# Patient Record
Sex: MALE | Race: BLACK OR AFRICAN AMERICAN | NOT HISPANIC OR LATINO | URBAN - METROPOLITAN AREA
[De-identification: names, ages, dates, MRNs, and addresses within clinical notes are randomized per-mention and may not be internally consistent; named-entity substitution may affect disease eponyms.]

---

## 2018-05-21 ENCOUNTER — EMERGENCY (EMERGENCY)
Facility: HOSPITAL | Age: 38
LOS: 1 days | Discharge: HOME | End: 2018-05-21
Admitting: EMERGENCY MEDICINE
Payer: COMMERCIAL

## 2018-05-21 VITALS
HEART RATE: 67 BPM | DIASTOLIC BLOOD PRESSURE: 76 MMHG | TEMPERATURE: 98 F | SYSTOLIC BLOOD PRESSURE: 138 MMHG | OXYGEN SATURATION: 99 % | RESPIRATION RATE: 18 BRPM

## 2018-05-21 DIAGNOSIS — R53.83 OTHER FATIGUE: ICD-10-CM

## 2018-05-21 DIAGNOSIS — R41.82 ALTERED MENTAL STATUS, UNSPECIFIED: ICD-10-CM

## 2018-05-21 DIAGNOSIS — F10.120 ALCOHOL ABUSE WITH INTOXICATION, UNCOMPLICATED: ICD-10-CM
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
## 2018-05-21 PROCEDURE — 99285 EMERGENCY DEPT VISIT HI MDM: CPT | Mod: 25

## 2018-05-21 NOTE — ED PROVIDER NOTE - OBJECTIVE STATEMENT
38 year old male brought in by EMS for alcohol intoxication and marijuana use. admits to both. patient with leaves in hair. no laceration on head.   Placed in stretcher and quickly falls asleep.  Unable to cooperate with remainder of history.

## 2018-05-21 NOTE — ED PROVIDER NOTE - MEDICAL DECISION MAKING DETAILS
Alcohol intoxication, leaves in hair will do head CT, not hypoglycemic, neuro exam non-focal, will observe and reassess frequently.

## 2018-05-22 VITALS
SYSTOLIC BLOOD PRESSURE: 131 MMHG | RESPIRATION RATE: 18 BRPM | TEMPERATURE: 98 F | HEART RATE: 73 BPM | DIASTOLIC BLOOD PRESSURE: 75 MMHG | OXYGEN SATURATION: 99 %

## 2018-05-22 LAB — ETHANOL SERPL-MCNC: 216 MG/DL — HIGH

## 2018-05-22 PROCEDURE — 70450 CT HEAD/BRAIN W/O DYE: CPT | Mod: 26

## 2018-05-22 RX ORDER — MIDAZOLAM HYDROCHLORIDE 1 MG/ML
5 INJECTION, SOLUTION INTRAMUSCULAR; INTRAVENOUS ONCE
Qty: 0 | Refills: 0 | Status: DISCONTINUED | OUTPATIENT
Start: 2018-05-22 | End: 2018-05-22

## 2018-05-22 RX ADMIN — MIDAZOLAM HYDROCHLORIDE 5 MILLIGRAM(S): 1 INJECTION, SOLUTION INTRAMUSCULAR; INTRAVENOUS at 00:26

## 2023-02-13 NOTE — ED PROVIDER NOTE - PROGRESS NOTE DETAILS
Call return for problems  Continue Ocean Spray as needed  Continue Bactroban as prescribed for 2 weeks  Resume intranasal steroid spray and intranasal antihistamine spray  Follow-up in 3 to 4 weeks I advised the patient of the risks in continuing to use tobacco and recommended complete cessation, The inherent risks including the risk of disability, development of a malignancy and/or death was discussed.  The patient indicated understanding.    The patient is now awake and alert, clinically sober.  Able to walk a straight line.  Repeat exam and neuro/cranial nerve exams normal.  No evidence of head/neck trauma.  Patient denies any pain or other complaints.  Denies cp/sob/ha/abd pain.  Abd soft, lungs clear, heart exam normal.  Chay po challenge.  Patient says only used alcohol no other substances.  Denies any assault.  Feels much better and pt feels safe for discharge.  No evidence of intoxication at this time or alcohol withdrawal.  No other complaints on discharge.

## 2023-06-12 ENCOUNTER — INPATIENT (INPATIENT)
Facility: HOSPITAL | Age: 43
LOS: 0 days | Discharge: HOME | DRG: 897 | End: 2023-06-13
Attending: INTERNAL MEDICINE | Admitting: INTERNAL MEDICINE
Payer: COMMERCIAL

## 2023-06-12 VITALS
RESPIRATION RATE: 15 BRPM | HEART RATE: 116 BPM | WEIGHT: 141.1 LBS | SYSTOLIC BLOOD PRESSURE: 88 MMHG | HEIGHT: 70 IN | TEMPERATURE: 97 F | OXYGEN SATURATION: 98 % | DIASTOLIC BLOOD PRESSURE: 60 MMHG

## 2023-06-12 LAB
ALBUMIN SERPL ELPH-MCNC: 3.3 G/DL — LOW (ref 3.4–5)
ALP SERPL-CCNC: 170 U/L — HIGH (ref 40–120)
ALT FLD-CCNC: 42 U/L — SIGNIFICANT CHANGE UP (ref 12–42)
ANION GAP SERPL CALC-SCNC: 14 MMOL/L — SIGNIFICANT CHANGE UP (ref 9–16)
APPEARANCE UR: CLEAR — SIGNIFICANT CHANGE UP
APTT BLD: 31.3 SEC — SIGNIFICANT CHANGE UP (ref 27.5–35.5)
AST SERPL-CCNC: 47 U/L — HIGH (ref 15–37)
BASOPHILS # BLD AUTO: 0.03 K/UL — SIGNIFICANT CHANGE UP (ref 0–0.2)
BASOPHILS NFR BLD AUTO: 0.4 % — SIGNIFICANT CHANGE UP (ref 0–2)
BILIRUB SERPL-MCNC: 1.2 MG/DL — SIGNIFICANT CHANGE UP (ref 0.2–1.2)
BILIRUB UR-MCNC: NEGATIVE — SIGNIFICANT CHANGE UP
BUN SERPL-MCNC: 22 MG/DL — SIGNIFICANT CHANGE UP (ref 7–23)
CALCIUM SERPL-MCNC: 9.5 MG/DL — SIGNIFICANT CHANGE UP (ref 8.5–10.5)
CHLORIDE SERPL-SCNC: 89 MMOL/L — LOW (ref 96–108)
CO2 SERPL-SCNC: 29 MMOL/L — SIGNIFICANT CHANGE UP (ref 22–31)
COLOR SPEC: YELLOW — SIGNIFICANT CHANGE UP
CREAT SERPL-MCNC: 2.32 MG/DL — HIGH (ref 0.5–1.3)
DIFF PNL FLD: ABNORMAL
EGFR: 34 ML/MIN/1.73M2 — LOW
EOSINOPHIL # BLD AUTO: 0.1 K/UL — SIGNIFICANT CHANGE UP (ref 0–0.5)
EOSINOPHIL NFR BLD AUTO: 1.2 % — SIGNIFICANT CHANGE UP (ref 0–6)
ETHANOL SERPL-MCNC: <3 MG/DL — SIGNIFICANT CHANGE UP
GLUCOSE SERPL-MCNC: 113 MG/DL — HIGH (ref 70–99)
GLUCOSE UR QL: NEGATIVE MG/DL — SIGNIFICANT CHANGE UP
HCT VFR BLD CALC: 37.9 % — LOW (ref 39–50)
HGB BLD-MCNC: 13 G/DL — SIGNIFICANT CHANGE UP (ref 13–17)
IMM GRANULOCYTES NFR BLD AUTO: 0.4 % — SIGNIFICANT CHANGE UP (ref 0–0.9)
INR BLD: 1.04 — SIGNIFICANT CHANGE UP (ref 0.88–1.16)
KETONES UR-MCNC: 15 MG/DL
LACTATE SERPL-SCNC: 0.8 MMOL/L — SIGNIFICANT CHANGE UP (ref 0.4–2)
LACTATE SERPL-SCNC: 2.8 MMOL/L — HIGH (ref 0.4–2)
LACTATE SERPL-SCNC: 3 MMOL/L — HIGH (ref 0.4–2)
LEUKOCYTE ESTERASE UR-ACNC: NEGATIVE — SIGNIFICANT CHANGE UP
LYMPHOCYTES # BLD AUTO: 1.8 K/UL — SIGNIFICANT CHANGE UP (ref 1–3.3)
LYMPHOCYTES # BLD AUTO: 21.4 % — SIGNIFICANT CHANGE UP (ref 13–44)
MCHC RBC-ENTMCNC: 34.1 PG — HIGH (ref 27–34)
MCHC RBC-ENTMCNC: 34.3 GM/DL — SIGNIFICANT CHANGE UP (ref 32–36)
MCV RBC AUTO: 99.5 FL — SIGNIFICANT CHANGE UP (ref 80–100)
MONOCYTES # BLD AUTO: 0.77 K/UL — SIGNIFICANT CHANGE UP (ref 0–0.9)
MONOCYTES NFR BLD AUTO: 9.1 % — SIGNIFICANT CHANGE UP (ref 2–14)
NEUTROPHILS # BLD AUTO: 5.7 K/UL — SIGNIFICANT CHANGE UP (ref 1.8–7.4)
NEUTROPHILS NFR BLD AUTO: 67.5 % — SIGNIFICANT CHANGE UP (ref 43–77)
NITRITE UR-MCNC: NEGATIVE — SIGNIFICANT CHANGE UP
NRBC # BLD: 0 /100 WBCS — SIGNIFICANT CHANGE UP (ref 0–0)
PCO2 BLDV: 50 MMHG — SIGNIFICANT CHANGE UP (ref 42–55)
PCP SPEC-MCNC: SIGNIFICANT CHANGE UP
PH BLDV: 7.37 — SIGNIFICANT CHANGE UP (ref 7.32–7.43)
PH UR: 5.5 — SIGNIFICANT CHANGE UP (ref 5–8)
PLATELET # BLD AUTO: 241 K/UL — SIGNIFICANT CHANGE UP (ref 150–400)
PO2 BLDV: <35 MMHG — SIGNIFICANT CHANGE UP (ref 25–45)
POTASSIUM SERPL-MCNC: 3.4 MMOL/L — LOW (ref 3.5–5.3)
POTASSIUM SERPL-SCNC: 3.4 MMOL/L — LOW (ref 3.5–5.3)
PROT SERPL-MCNC: 8.5 G/DL — HIGH (ref 6.4–8.2)
PROT UR-MCNC: SIGNIFICANT CHANGE UP MG/DL
PROTHROM AB SERPL-ACNC: 12.2 SEC — SIGNIFICANT CHANGE UP (ref 10.5–13.4)
RBC # BLD: 3.81 M/UL — LOW (ref 4.2–5.8)
RBC # FLD: 13.1 % — SIGNIFICANT CHANGE UP (ref 10.3–14.5)
SAO2 % BLDV: 29 % — LOW (ref 67–88)
SARS-COV-2 RNA SPEC QL NAA+PROBE: SIGNIFICANT CHANGE UP
SODIUM SERPL-SCNC: 132 MMOL/L — SIGNIFICANT CHANGE UP (ref 132–145)
SP GR SPEC: 1.04 — HIGH (ref 1–1.03)
TROPONIN I, HIGH SENSITIVITY RESULT: 21.8 NG/L — SIGNIFICANT CHANGE UP
UROBILINOGEN FLD QL: 1 MG/DL — SIGNIFICANT CHANGE UP (ref 0.2–1)
WBC # BLD: 8.43 K/UL — SIGNIFICANT CHANGE UP (ref 3.8–10.5)
WBC # FLD AUTO: 8.43 K/UL — SIGNIFICANT CHANGE UP (ref 3.8–10.5)

## 2023-06-12 PROCEDURE — 0042T: CPT

## 2023-06-12 PROCEDURE — 70496 CT ANGIOGRAPHY HEAD: CPT | Mod: 26

## 2023-06-12 PROCEDURE — 99222 1ST HOSP IP/OBS MODERATE 55: CPT | Mod: GC

## 2023-06-12 PROCEDURE — 99285 EMERGENCY DEPT VISIT HI MDM: CPT

## 2023-06-12 PROCEDURE — 70498 CT ANGIOGRAPHY NECK: CPT | Mod: 26

## 2023-06-12 RX ORDER — THIAMINE MONONITRATE (VIT B1) 100 MG
100 TABLET ORAL ONCE
Refills: 0 | Status: COMPLETED | OUTPATIENT
Start: 2023-06-12 | End: 2023-06-12

## 2023-06-12 RX ORDER — CLOPIDOGREL BISULFATE 75 MG/1
75 TABLET, FILM COATED ORAL ONCE
Refills: 0 | Status: COMPLETED | OUTPATIENT
Start: 2023-06-12 | End: 2023-06-12

## 2023-06-12 RX ORDER — SODIUM CHLORIDE 9 MG/ML
1000 INJECTION INTRAMUSCULAR; INTRAVENOUS; SUBCUTANEOUS ONCE
Refills: 0 | Status: COMPLETED | OUTPATIENT
Start: 2023-06-12 | End: 2023-06-12

## 2023-06-12 RX ORDER — CEFTRIAXONE 500 MG/1
1000 INJECTION, POWDER, FOR SOLUTION INTRAMUSCULAR; INTRAVENOUS ONCE
Refills: 0 | Status: COMPLETED | OUTPATIENT
Start: 2023-06-12 | End: 2023-06-12

## 2023-06-12 RX ORDER — ACETAMINOPHEN 500 MG
975 TABLET ORAL ONCE
Refills: 0 | Status: COMPLETED | OUTPATIENT
Start: 2023-06-12 | End: 2023-06-12

## 2023-06-12 RX ORDER — VALACYCLOVIR 500 MG/1
1000 TABLET, FILM COATED ORAL ONCE
Refills: 0 | Status: COMPLETED | OUTPATIENT
Start: 2023-06-12 | End: 2023-06-12

## 2023-06-12 RX ORDER — ASPIRIN/CALCIUM CARB/MAGNESIUM 324 MG
81 TABLET ORAL DAILY
Refills: 0 | Status: DISCONTINUED | OUTPATIENT
Start: 2023-06-12 | End: 2023-06-13

## 2023-06-12 RX ORDER — DIAZEPAM 5 MG
10 TABLET ORAL ONCE
Refills: 0 | Status: DISCONTINUED | OUTPATIENT
Start: 2023-06-12 | End: 2023-06-12

## 2023-06-12 RX ADMIN — Medication 100 MILLIGRAM(S): at 19:11

## 2023-06-12 RX ADMIN — SODIUM CHLORIDE 1000 MILLILITER(S): 9 INJECTION INTRAMUSCULAR; INTRAVENOUS; SUBCUTANEOUS at 15:45

## 2023-06-12 RX ADMIN — VALACYCLOVIR 1000 MILLIGRAM(S): 500 TABLET, FILM COATED ORAL at 15:45

## 2023-06-12 RX ADMIN — CEFTRIAXONE 100 MILLIGRAM(S): 500 INJECTION, POWDER, FOR SOLUTION INTRAMUSCULAR; INTRAVENOUS at 15:44

## 2023-06-12 RX ADMIN — Medication 975 MILLIGRAM(S): at 19:10

## 2023-06-12 RX ADMIN — SODIUM CHLORIDE 1000 MILLILITER(S): 9 INJECTION INTRAMUSCULAR; INTRAVENOUS; SUBCUTANEOUS at 13:56

## 2023-06-12 RX ADMIN — Medication 10 MILLIGRAM(S): at 17:17

## 2023-06-12 RX ADMIN — CLOPIDOGREL BISULFATE 75 MILLIGRAM(S): 75 TABLET, FILM COATED ORAL at 21:19

## 2023-06-12 RX ADMIN — Medication 81 MILLIGRAM(S): at 21:19

## 2023-06-12 RX ADMIN — SODIUM CHLORIDE 1000 MILLILITER(S): 9 INJECTION INTRAMUSCULAR; INTRAVENOUS; SUBCUTANEOUS at 13:08

## 2023-06-12 NOTE — H&P ADULT - PROBLEM SELECTOR PLAN 2
Patient reports drinking up to 1 pint daily for over 20 years. Last drink 6/11 PM (approx 24 hours prior to admission). No history of alcohol withdrawal, no hx of seizures/intubations. Denies hx of cirrhosis. CIWA 2 for mild anxiety on exam.   - low risk CIWA protocol  - ativan prn for CIWA > 8  - thiamine, MV, folate  - monitor LFTs

## 2023-06-12 NOTE — H&P ADULT - SOCIAL HISTORY: ALCOHOL USE
drinks up to 1 pint a day, more on the weekends  no history of withdrawal or seizures/intubations in the past  previously in rehab on naltrexone  last drink 6/11 PM

## 2023-06-12 NOTE — H&P ADULT - PROBLEM SELECTOR PLAN 7
F: none  E: replete as needed  N: DASH diet  DVT ppx: heparin subq  GI ppx: not indicated  Dispo: tele

## 2023-06-12 NOTE — ED ADULT NURSE NOTE - CHIEF COMPLAINT QUOTE
BIBA from [facility] for sudden weakness since 845am; felt weak whiel in an elevator and also c/o left ear ringing and muffling; at [facility], EMS said "they couldn't get a BP on him and we had to do a manual BP; his blood pressure was low there."; +slurred speech and right side facial droop noted in triage; called code stroke at 2782

## 2023-06-12 NOTE — ED ADULT NURSE REASSESSMENT NOTE - NS ED NURSE REASSESS COMMENT FT1
Pt reports no dysarthria at this time, speaking in complete sentences with Ease. L sided mouth droop still noted. Pt otherwise denies neuro symptoms, and none noted on assessment.

## 2023-06-12 NOTE — H&P ADULT - NSHPPHYSICALEXAM_GEN_ALL_CORE
.  VITAL SIGNS:  T(C): 37.4 (06-12-23 @ 23:17), Max: 37.6 (06-12-23 @ 17:02)  T(F): 99.3 (06-12-23 @ 23:17), Max: 99.6 (06-12-23 @ 17:02)  HR: 103 (06-13-23 @ 00:51) (97 - 116)  BP: 134/96 (06-13-23 @ 00:51) (88/60 - 134/96)  BP(mean): 111 (06-13-23 @ 00:51) (111 - 111)  RR: 18 (06-13-23 @ 00:51) (15 - 18)  SpO2: 97% (06-13-23 @ 00:51) (97% - 98%)  Wt(kg): --    PHYSICAL EXAM:    Constitutional: resting comfortably in bed; NAD  Head: NC/AT, lipoma on forehead  Eyes: PERRL, EOMI, anicteric sclera  ENT: no nasal discharge; uvula midline, no oropharyngeal erythema or exudates; MMM  Neck: supple; no JVD or thyromegaly  Respiratory: CTA B/L; no W/R/R, no retractions  Cardiac: +S1/S2; RRR; no M/R/G  Gastrointestinal: abdomen soft, NT/ND; no rebound or guarding; +BSx4  Back: spine midline, no bony tenderness or step-offs; no CVAT B/L  Extremities: WWP, no clubbing or cyanosis; no peripheral edema  Musculoskeletal: NROM x4; no joint swelling, tenderness or erythema  Vascular: 2+ radial, DP/PT pulses B/L  Dermatologic: skin warm, dry and intact; no rashes, wounds, or scars; +mole on back (chronic)  Neurologic: AAOx3; CNII-XII grossly intact; no focal deficits; strength and sensation intact  Psychiatric: appears mildly anxious .  VITAL SIGNS:  T(C): 37.4 (06-12-23 @ 23:17), Max: 37.6 (06-12-23 @ 17:02)  T(F): 99.3 (06-12-23 @ 23:17), Max: 99.6 (06-12-23 @ 17:02)  HR: 103 (06-13-23 @ 00:51) (97 - 116)  BP: 134/96 (06-13-23 @ 00:51) (88/60 - 134/96)  BP(mean): 111 (06-13-23 @ 00:51) (111 - 111)  RR: 18 (06-13-23 @ 00:51) (15 - 18)  SpO2: 97% (06-13-23 @ 00:51) (97% - 98%)  Wt(kg): --    PHYSICAL EXAM:    Constitutional: resting comfortably in bed; NAD  Head: NC/AT, lipoma on forehead  Eyes: PERRL, EOMI, anicteric sclera  ENT: no nasal discharge; uvula midline, no oropharyngeal erythema or exudates; MMM  Neck: supple; no JVD or thyromegaly  Respiratory: CTA B/L; no W/R/R, no retractions  Cardiac: +S1/S2; RRR; no M/R/G  Gastrointestinal: abdomen soft, NT/ND; no rebound or guarding; +BSx4  Back: spine midline, no bony tenderness or step-offs; no CVAT B/L  Extremities: WWP, no clubbing or cyanosis; no peripheral edema  Musculoskeletal: NROM x4; no joint swelling, tenderness or erythema  Vascular: 2+ radial, DP/PT pulses B/L  Dermatologic: skin warm, dry and intact; no rashes, wounds, or scars; +mole on back (chronic)  Neurologic: AAOx3; CNII-XII grossly intact; ?mild facial asymmetry, no focal deficits; strength and sensation intact  Psychiatric: appears mildly anxious

## 2023-06-12 NOTE — ED ADULT NURSE REASSESSMENT NOTE - NS ED NURSE REASSESS COMMENT FT1
Report received from PARVIN Morataya. Pt resting in stretcher comfortably. Denies any medical complaints at this time, only c/o some anxiety regarding admission. Vs done. Ambulate with steady gait to bathroom. denies weakness.

## 2023-06-12 NOTE — H&P ADULT - PROBLEM SELECTOR PLAN 1
Patient with TIA v. CVA. Reports right ear fullness and blurry vision that started the morning of admission, then resolved spontaneously. Hx of stroke in 2018 with L facial droop but denies any weakness or residual symptoms. Takes Lipitor 80 daily, but does not take aspirin. Stroke code called, negative CT, CTAs and perfusion studies. On exam, patient with grossly intact neuro exam.   - start ASA 81 and Plavix 75 qd  - c/w lipitor 80 qhs  - neuro checks q4h  - permissive HTN iso possible stroke, will hold home BP meds  - ordered MRI brain Patient with TIA v. CVA. Reports right ear fullness and blurry vision that started the morning of admission, then resolved spontaneously. Hx of stroke in 2018 with L facial droop but denies any weakness or residual symptoms. Takes Lipitor 80 daily, but does not take aspirin. Stroke code called, negative CT, CTAs and perfusion studies. On exam, patient with grossly intact neuro exam.  - start ASA 81 and Plavix 75 qd  - c/w lipitor 80 qhs  - neuro checks q4h  - permissive HTN iso possible stroke, will hold home BP meds  - ordered MRI brain  - A1c, lipid panel Patient with TIA v. CVA. Reports right ear fullness and blurry vision that started the morning of admission, then resolved spontaneously. Hx of stroke in 2018 with L facial droop but denies any weakness or residual symptoms. Takes Lipitor 80 daily, but does not take aspirin. Stroke code called, negative CT, CTAs and perfusion studies. On exam, patient with grossly intact neuro exam.  - start ASA 81 and Plavix 75 qd  - c/w lipitor 80 qhs  - neuro checks q4h  - permissive HTN iso possible stroke, will hold home BP meds  - ordered MRI brain  - A1c, lipid panel  - neurology following, appreciate recs

## 2023-06-12 NOTE — H&P ADULT - HISTORY OF PRESENT ILLNESS
HPI:    In the ED:  Initial vital signs: T: XX F, HR: XX, BP: XX, R: XX, SpO2: XX% on RA  Labs: significant for  CXR:   EKG:   Medications:   Consults: none  Patient is a 42yo M with PMHx of CVA in 2018 (L sided facial droop at the time, no current deficits), chronic pancreatitis, alcohol use disorder, HTN, gout who presents from [facility] for weakness, right ear "popping sensation", and blurred vision that started that morning and resolved prior to presentation to ED. However, on arrival noted to have slurred speech and right side facial droop, stroke code called. Studies largely negative.     In the ED:  Initial vitals: Temp 97.4 F, , BP 88/60, RR 15, SpO2 98% RA  Labs significant for: lactate 2.8, K 3.4, Cl 89, Cr 2.32, alk phos 170, AST 47, UA: spec grv 1.040, mod blood, granular casts present, ketones 15; UTox positive for THC, BAL <3  CTA H&N: No large vessel occlusion or high-grade stenosis. No significant steno-occlusive disease  CTH: No acute intracranial hemorrhage, mass effect or demarcated territorial   infarction.  Interventions: 1L NS x 3, CTX 1g x 1, ASA 81mg x 1, plavix 75mg x 1, valtrex 1g PO x 1, thiamine 100mg IV x 1, valium 1mg IV x 1, acetaminophen 975mg PO x 1    Admitted to telemetry iso possible CVA v. TIA. Patient seen and examined at beside. Reports no symptoms at this time aside from some anxiety about being in the hospital. Reports ringing/fullness of R ear improved after removal of ear wax at [facility], and blurred vision had largely resolved upon presentation to ED. Reports hx of stroke in 2018 with no current deficits. Denies headache, blurred vision, ear fullness, facial weakness, chest pain, SOB, abdominal pain, urinary changes, fevers/chills, N/V, diarrhea/constipation, LE edema at this time.  Patient is a 44yo M with PMHx of CVA in 2018 (L sided facial droop at the time, no current deficits), chronic pancreatitis, alcohol use disorder, HTN, gout who presents from [facility] for weakness, right ear "popping sensation", and blurred vision that started that morning and resolved prior to presentation to ED. However, on arrival noted to have slurred speech and right side facial droop, stroke code called. Studies largely negative.     In the ED:  Initial vitals: Temp 97.4 F, , BP 88/60, RR 15, SpO2 98% RA  Labs significant for: lactate 2.8, K 3.4, Cl 89, Cr 2.32, alk phos 170, AST 47, UA: spec grAv 1.040, mod blood, granular casts present, ketones 15; UTox positive for THC, BAL <3  CTA H&N: No large vessel occlusion or high-grade stenosis. No significant steno-occlusive disease  CTH: No acute intracranial hemorrhage, mass effect or demarcated territorial   infarction.  Interventions: 1L NS x 3, CTX 1g x 1, ASA 81mg x 1, plavix 75mg x 1, valtrex 1g PO x 1, thiamine 100mg IV x 1, valium 1mg IV x 1, acetaminophen 975mg PO x 1    Admitted to telemetry iso possible CVA v. TIA. Patient seen and examined at beside. Reports no symptoms at this time aside from some anxiety about being in the hospital. Reports ringing/fullness of R ear improved after removal of ear wax at [facility], and blurred vision had largely resolved upon presentation to ED. Reports hx of stroke in 2018 with no current deficits. Denies headache, blurred vision, ear fullness, facial weakness, chest pain, SOB, abdominal pain, urinary changes, fevers/chills, N/V, diarrhea/constipation, LE edema at this time.

## 2023-06-12 NOTE — ED PROVIDER NOTE - PROGRESS NOTE DETAILS
Patient admits to more than 1 pint a day of alcohol for years.  Denies history of withdrawal syndrome.  Currently denies anxiety, feeling sweaty, palpitations, visual or auditory hallucinations.

## 2023-06-12 NOTE — H&P ADULT - PROBLEM SELECTOR PLAN 5
99.4 Patient with hx of HTN on home amlodipine 5mg qd and olmesartan 40mg qd.   - hold home meds now for permissive HTN iso stroke

## 2023-06-12 NOTE — ED ADULT TRIAGE NOTE - CHIEF COMPLAINT QUOTE
BIBA from [facility] for sudden weakness since 845am; felt weak whiel in an elevator and also c/o left ear ringing and muffling; at [facility], EMS said "they couldn't get a BP on him and we had to do a manual BP; his blood pressure was low there."; +slurred speech and right side facial droop noted in triage; called code stroke at 5342

## 2023-06-12 NOTE — ED PROVIDER NOTE - OBJECTIVE STATEMENT
44 y/o M w/hx chronic pancreatitis from alcoholism, in recovery w/planned pancreatic stenting to take place in the next few weeks, HTN on ARB & amlodipine 5mg, depression, BIBEMS from [facility] c/o acute onset R ear fullness/decreased hearing, blurry vision waxing/waning, lightheadedness, global fatigue with predominantly RUE weakness all starting at 8:45AM. States he woke up at 7am feeling in his normal state of health. Of note, was hypotensive in [facility] and in route to hospital with BPs 70s/40s. No IVF given PTA. Denies fever/chills/recent illness. No HA, neck pain, CP/cough/SOB, abd pain, n/v, changes in stools, or uinary sx. Notes no changes to meds.

## 2023-06-12 NOTE — H&P ADULT - PROBLEM SELECTOR PLAN 3
Chronic, likely 2/2 heavy alcohol use. Patient currently being evaluated by outside physician for pancreatitis. Denies abdominal pain or discomfort at this time.  - monitor symptoms for now  - f/u lipase

## 2023-06-12 NOTE — H&P ADULT - ASSESSMENT
Patient is a 44yo M with PMHx of CVA in 2018 (L sided facial droop at the time, no current deficits), chronic pancreatitis, alcohol use disorder, HTN, gout who presents from [facility] for weakness, right ear "popping sensation", and blurred vision concerning for CVA v. TIA, admitted to telemetry for monitoring.

## 2023-06-12 NOTE — ED ADULT NURSE REASSESSMENT NOTE - NS ED NURSE REASSESS COMMENT FT1
As per MD Josue, q1h Neuro and [facility] no longer indicated. Orders discontinued as per MD Josue order.

## 2023-06-12 NOTE — ED PROVIDER NOTE - PHYSICAL EXAMINATION
VITAL SIGNS: I have reviewed nursing notes and confirm.  CONSTITUTIONAL: Well-developed; well-nourished; in no acute distress.  SKIN: Skin exam is warm and dry, no acute rash.  HEAD: Normocephalic; atraumatic.  EYES: PERRL, EOM intact; conjunctiva and sclera clear.  ENT: No nasal discharge; airway clear.  NECK: Supple; non tender.  CARD: + tachycardic regular rhythm  RESP: Unlabored. No wheezes, rales or rhonchi.  ABD: soft; non-distended; non-tender  EXT: Normal ROM. No cyanosis or edema. Non-ttp all ext, distal pulses intact  NEURO: Alert, oriented x3, + L sided facial droop with forehead sparing, + mild dysarthria, 5/5 strength all ext, no sensory deficits, no dysmetria finger to nose, no tremor or fasciculations, gait deferred  PSYCH: Cooperative, appropriate.

## 2023-06-12 NOTE — H&P ADULT - PROBLEM SELECTOR PLAN 4
Patient with Cr elevated to 2.32 on arrival, likely prerenal iso poor PO intake recently v. CKD. S/p 3L NS in the ED.  - obtain collateral about baseline sCr  - urine lytes  - bladder scan to r/o obstruction  - consider renal sono  - monitor I/Os  - avoid nephrotoxic medications  - monitor BMP

## 2023-06-12 NOTE — ED PROVIDER NOTE - NS SC NIH SEDATED_GEN_A_CORE
No
Non-slip footwear when patient is off stretcher/Physically safe environment: no spills, clutter or unnecessary equipment/[place] to call system/Instruct patient to call for assistance/Reinforce activity limits and safety measures with patient and family/Monitor gait and stability/Monitor for mental status changes and reorient to person, place, and time

## 2023-06-12 NOTE — H&P ADULT - SOCIAL HISTORY: SUBSTANCE USE
occasional marijuana use  has used cocaine and asmara dust in the past, last used 2019  denies ever using IV drugs

## 2023-06-12 NOTE — H&P ADULT - ATTENDING COMMENTS
Asad Navarrete [id number] [place]  This is a 42 y/o male with h/o alcohol use disorder, chronic pancreatitis  and HTN who had acute onset of R facial droop and slurred speech.  CT and CTA showed no acute changes, neuro was contacted and he was not a candidate for tPA.  He was hypotensive and was given IVF, cre 2.32,   A/P:  -TIA vs CVA with facial droop and slurred speech  -alcohol use disorder  -HTN  -acute renal injury; pre renal  >Asa. Plavix, statins  >permissive HTN, no pressors  >MRI brain  >thiamine, folate  >f/u with neurology  See the resident's note for the rest of  the details as discussed.  D/W neurologist and ED physician at [facility]

## 2023-06-12 NOTE — ED PROVIDER NOTE - CLINICAL SUMMARY MEDICAL DECISION MAKING FREE TEXT BOX
+ facial droop and dysarthria of acute onset in setting of hypotension/tachycardia/elevated lactate and low grade temp of 99.6 orally. Stroke code called and shared decision made to not pursue TNK, negative CT, CTAs and perfusion studies. Treated for possible sepsis despite no leukocytosis with ceftriaxone, cultures sent. Given thiamine and valium to cover for possible early etoh withdrawal syndrome. Given valtrex for possible bell's palsy (though less likely given that facial droop does not include the forehead). d/w both stroke and MICU, agree to admit pt to medicine stepdown unit.

## 2023-06-12 NOTE — H&P ADULT - NSICDXPASTMEDICALHX_GEN_ALL_CORE_FT
PAST MEDICAL HISTORY:  Gout     History of alcohol use disorder     History of stroke     HTN (hypertension)

## 2023-06-12 NOTE — H&P ADULT - NSHPLABSRESULTS_GEN_ALL_CORE
13.0   8.43  )-----------( 241      ( 2023 12:45 )             37.9       -12    132  |  89<L>  |  22  ----------------------------<  113<H>  3.4<L>   |  29  |  2.32<H>    Ca    9.5      2023 12:45    TPro  8.5<H>  /  Alb  3.3<L>  /  TBili  1.2  /  DBili  x   /  AST  47<H>  /  ALT  42  /  AlkPhos  170<H>                Urinalysis Basic - ( 2023 12:45 )    Color: Yellow / Appearance: Clear / S.040 / pH: x  Gluc: x / Ketone: 15 mg/dL  / Bili: Negative / Urobili: 1.0 mg/dL   Blood: x / Protein: Trace mg/dL / Nitrite: Negative   Leuk Esterase: Negative / RBC: >10 /HPF / WBC 0-5 /HPF   Sq Epi: x / Non Sq Epi: x / Bacteria: Moderate /HPF        PT/INR - ( 2023 12:45 )   PT: 12.2 sec;   INR: 1.04          PTT - ( 2023 12:45 )  PTT:31.3 sec    Lactate Trend   @ 17:51 Lactate:0.8    @ 14:41 Lactate:3.0   12 @ 12:45 Lactate:2.8             CAPILLARY BLOOD GLUCOSE      POCT Blood Glucose.: 111 mg/dL (2023 12:41)

## 2023-06-12 NOTE — ED ADULT NURSE NOTE - OBJECTIVE STATEMENT
Pt aox4 with steady gait. BIBA from [facility] for sudden weakness since 845am; felt weak while in an elevator and also c/o left ear ringing and muffling; at [facility], EMS said "they couldn't get a BP on him and we had to do a manual BP; his blood pressure was low there."; +slurred speech and right side facial droop noted in triage; called code stroke at 1239.

## 2023-06-13 VITALS
RESPIRATION RATE: 20 BRPM | DIASTOLIC BLOOD PRESSURE: 86 MMHG | OXYGEN SATURATION: 97 % | SYSTOLIC BLOOD PRESSURE: 132 MMHG | HEART RATE: 92 BPM

## 2023-06-13 DIAGNOSIS — M10.9 GOUT, UNSPECIFIED: ICD-10-CM

## 2023-06-13 DIAGNOSIS — R65.10 SYSTEMIC INFLAMMATORY RESPONSE SYNDROME (SIRS) OF NON-INFECTIOUS ORIGIN WITHOUT ACUTE ORGAN DYSFUNCTION: ICD-10-CM

## 2023-06-13 DIAGNOSIS — K85.90 ACUTE PANCREATITIS WITHOUT NECROSIS OR INFECTION, UNSPECIFIED: ICD-10-CM

## 2023-06-13 DIAGNOSIS — Z29.9 ENCOUNTER FOR PROPHYLACTIC MEASURES, UNSPECIFIED: ICD-10-CM

## 2023-06-13 DIAGNOSIS — N17.9 ACUTE KIDNEY FAILURE, UNSPECIFIED: ICD-10-CM

## 2023-06-13 DIAGNOSIS — F10.939 ALCOHOL USE, UNSPECIFIED WITH WITHDRAWAL, UNSPECIFIED: ICD-10-CM

## 2023-06-13 DIAGNOSIS — Z87.898 PERSONAL HISTORY OF OTHER SPECIFIED CONDITIONS: ICD-10-CM

## 2023-06-13 DIAGNOSIS — I10 ESSENTIAL (PRIMARY) HYPERTENSION: ICD-10-CM

## 2023-06-13 DIAGNOSIS — G45.9 TRANSIENT CEREBRAL ISCHEMIC ATTACK, UNSPECIFIED: ICD-10-CM

## 2023-06-13 LAB
A1C WITH ESTIMATED AVERAGE GLUCOSE RESULT: 5.2 % — SIGNIFICANT CHANGE UP (ref 4–5.6)
ALBUMIN SERPL ELPH-MCNC: 3.1 G/DL — LOW (ref 3.3–5)
ALP SERPL-CCNC: 140 U/L — HIGH (ref 40–120)
ALT FLD-CCNC: 21 U/L — SIGNIFICANT CHANGE UP (ref 10–45)
ANION GAP SERPL CALC-SCNC: 15 MMOL/L — SIGNIFICANT CHANGE UP (ref 5–17)
AST SERPL-CCNC: 27 U/L — SIGNIFICANT CHANGE UP (ref 10–40)
BASOPHILS # BLD AUTO: 0.01 K/UL — SIGNIFICANT CHANGE UP (ref 0–0.2)
BASOPHILS NFR BLD AUTO: 0.2 % — SIGNIFICANT CHANGE UP (ref 0–2)
BILIRUB SERPL-MCNC: 0.4 MG/DL — SIGNIFICANT CHANGE UP (ref 0.2–1.2)
BUN SERPL-MCNC: 18 MG/DL — SIGNIFICANT CHANGE UP (ref 7–23)
CALCIUM SERPL-MCNC: 8.4 MG/DL — SIGNIFICANT CHANGE UP (ref 8.4–10.5)
CHLORIDE SERPL-SCNC: 97 MMOL/L — SIGNIFICANT CHANGE UP (ref 96–108)
CHOLEST SERPL-MCNC: 113 MG/DL — SIGNIFICANT CHANGE UP
CO2 SERPL-SCNC: 24 MMOL/L — SIGNIFICANT CHANGE UP (ref 22–31)
CREAT ?TM UR-MCNC: 114 MG/DL — SIGNIFICANT CHANGE UP
CREAT SERPL-MCNC: 1.46 MG/DL — HIGH (ref 0.5–1.3)
EGFR: 60 ML/MIN/1.73M2 — SIGNIFICANT CHANGE UP
EOSINOPHIL # BLD AUTO: 0.08 K/UL — SIGNIFICANT CHANGE UP (ref 0–0.5)
EOSINOPHIL NFR BLD AUTO: 1.9 % — SIGNIFICANT CHANGE UP (ref 0–6)
ESTIMATED AVERAGE GLUCOSE: 103 MG/DL — SIGNIFICANT CHANGE UP (ref 68–114)
GLUCOSE SERPL-MCNC: 104 MG/DL — HIGH (ref 70–99)
HCT VFR BLD CALC: 29.5 % — LOW (ref 39–50)
HDLC SERPL-MCNC: 25 MG/DL — LOW
HGB BLD-MCNC: 10.2 G/DL — LOW (ref 13–17)
IMM GRANULOCYTES NFR BLD AUTO: 0.5 % — SIGNIFICANT CHANGE UP (ref 0–0.9)
LIDOCAIN IGE QN: 976 U/L — HIGH (ref 7–60)
LIPID PNL WITH DIRECT LDL SERPL: 61 MG/DL — SIGNIFICANT CHANGE UP
LYMPHOCYTES # BLD AUTO: 1.02 K/UL — SIGNIFICANT CHANGE UP (ref 1–3.3)
LYMPHOCYTES # BLD AUTO: 23.6 % — SIGNIFICANT CHANGE UP (ref 13–44)
MAGNESIUM SERPL-MCNC: 1.2 MG/DL — LOW (ref 1.6–2.6)
MCHC RBC-ENTMCNC: 34.3 PG — HIGH (ref 27–34)
MCHC RBC-ENTMCNC: 34.6 GM/DL — SIGNIFICANT CHANGE UP (ref 32–36)
MCV RBC AUTO: 99.3 FL — SIGNIFICANT CHANGE UP (ref 80–100)
MONOCYTES # BLD AUTO: 0.46 K/UL — SIGNIFICANT CHANGE UP (ref 0–0.9)
MONOCYTES NFR BLD AUTO: 10.6 % — SIGNIFICANT CHANGE UP (ref 2–14)
NEUTROPHILS # BLD AUTO: 2.73 K/UL — SIGNIFICANT CHANGE UP (ref 1.8–7.4)
NEUTROPHILS NFR BLD AUTO: 63.2 % — SIGNIFICANT CHANGE UP (ref 43–77)
NON HDL CHOLESTEROL: 88 MG/DL — SIGNIFICANT CHANGE UP
NRBC # BLD: 0 /100 WBCS — SIGNIFICANT CHANGE UP (ref 0–0)
PHOSPHATE SERPL-MCNC: 4.5 MG/DL — SIGNIFICANT CHANGE UP (ref 2.5–4.5)
PLATELET # BLD AUTO: 183 K/UL — SIGNIFICANT CHANGE UP (ref 150–400)
POTASSIUM SERPL-MCNC: 3.2 MMOL/L — LOW (ref 3.5–5.3)
POTASSIUM SERPL-SCNC: 3.2 MMOL/L — LOW (ref 3.5–5.3)
PROT ?TM UR-MCNC: 12 MG/DL — SIGNIFICANT CHANGE UP (ref 0–12)
PROT SERPL-MCNC: 6.4 G/DL — SIGNIFICANT CHANGE UP (ref 6–8.3)
PROT/CREAT UR-RTO: 0.1 RATIO — SIGNIFICANT CHANGE UP (ref 0–0.2)
RBC # BLD: 2.97 M/UL — LOW (ref 4.2–5.8)
RBC # FLD: 13.3 % — SIGNIFICANT CHANGE UP (ref 10.3–14.5)
SODIUM SERPL-SCNC: 136 MMOL/L — SIGNIFICANT CHANGE UP (ref 135–145)
SODIUM UR-SCNC: 72 MMOL/L — SIGNIFICANT CHANGE UP
TRIGL SERPL-MCNC: 133 MG/DL — SIGNIFICANT CHANGE UP
TSH SERPL-MCNC: 2.07 UIU/ML — SIGNIFICANT CHANGE UP (ref 0.27–4.2)
UUN UR-MCNC: 315 MG/DL — SIGNIFICANT CHANGE UP
WBC # BLD: 4.32 K/UL — SIGNIFICANT CHANGE UP (ref 3.8–10.5)
WBC # FLD AUTO: 4.32 K/UL — SIGNIFICANT CHANGE UP (ref 3.8–10.5)

## 2023-06-13 PROCEDURE — 70450 CT HEAD/BRAIN W/O DYE: CPT

## 2023-06-13 PROCEDURE — 84443 ASSAY THYROID STIM HORMONE: CPT

## 2023-06-13 PROCEDURE — 80061 LIPID PANEL: CPT

## 2023-06-13 PROCEDURE — 80053 COMPREHEN METABOLIC PANEL: CPT

## 2023-06-13 PROCEDURE — 85730 THROMBOPLASTIN TIME PARTIAL: CPT

## 2023-06-13 PROCEDURE — 96375 TX/PRO/DX INJ NEW DRUG ADDON: CPT

## 2023-06-13 PROCEDURE — 82570 ASSAY OF URINE CREATININE: CPT

## 2023-06-13 PROCEDURE — 84100 ASSAY OF PHOSPHORUS: CPT

## 2023-06-13 PROCEDURE — 87040 BLOOD CULTURE FOR BACTERIA: CPT

## 2023-06-13 PROCEDURE — 36415 COLL VENOUS BLD VENIPUNCTURE: CPT

## 2023-06-13 PROCEDURE — 70498 CT ANGIOGRAPHY NECK: CPT

## 2023-06-13 PROCEDURE — 0042T: CPT

## 2023-06-13 PROCEDURE — 83605 ASSAY OF LACTIC ACID: CPT

## 2023-06-13 PROCEDURE — 82962 GLUCOSE BLOOD TEST: CPT

## 2023-06-13 PROCEDURE — 85025 COMPLETE CBC W/AUTO DIFF WBC: CPT

## 2023-06-13 PROCEDURE — 99291 CRITICAL CARE FIRST HOUR: CPT | Mod: 25

## 2023-06-13 PROCEDURE — 85610 PROTHROMBIN TIME: CPT

## 2023-06-13 PROCEDURE — 70496 CT ANGIOGRAPHY HEAD: CPT

## 2023-06-13 PROCEDURE — 84156 ASSAY OF PROTEIN URINE: CPT

## 2023-06-13 PROCEDURE — 80307 DRUG TEST PRSMV CHEM ANLYZR: CPT

## 2023-06-13 PROCEDURE — 99233 SBSQ HOSP IP/OBS HIGH 50: CPT | Mod: GC

## 2023-06-13 PROCEDURE — 83690 ASSAY OF LIPASE: CPT

## 2023-06-13 PROCEDURE — 84540 ASSAY OF URINE/UREA-N: CPT

## 2023-06-13 PROCEDURE — 83735 ASSAY OF MAGNESIUM: CPT

## 2023-06-13 PROCEDURE — 82803 BLOOD GASES ANY COMBINATION: CPT

## 2023-06-13 PROCEDURE — 96374 THER/PROPH/DIAG INJ IV PUSH: CPT

## 2023-06-13 PROCEDURE — 83036 HEMOGLOBIN GLYCOSYLATED A1C: CPT

## 2023-06-13 PROCEDURE — 81001 URINALYSIS AUTO W/SCOPE: CPT

## 2023-06-13 PROCEDURE — 84484 ASSAY OF TROPONIN QUANT: CPT

## 2023-06-13 PROCEDURE — 87635 SARS-COV-2 COVID-19 AMP PRB: CPT

## 2023-06-13 PROCEDURE — 84300 ASSAY OF URINE SODIUM: CPT

## 2023-06-13 RX ORDER — ACETAMINOPHEN 500 MG
650 TABLET ORAL EVERY 6 HOURS
Refills: 0 | Status: DISCONTINUED | OUTPATIENT
Start: 2023-06-13 | End: 2023-06-13

## 2023-06-13 RX ORDER — ATORVASTATIN CALCIUM 80 MG/1
1 TABLET, FILM COATED ORAL
Refills: 0 | DISCHARGE

## 2023-06-13 RX ORDER — ATORVASTATIN CALCIUM 80 MG/1
80 TABLET, FILM COATED ORAL AT BEDTIME
Refills: 0 | Status: DISCONTINUED | OUTPATIENT
Start: 2023-06-13 | End: 2023-06-13

## 2023-06-13 RX ORDER — ALLOPURINOL 300 MG
1 TABLET ORAL
Refills: 0 | DISCHARGE

## 2023-06-13 RX ORDER — THIAMINE MONONITRATE (VIT B1) 100 MG
100 TABLET ORAL DAILY
Refills: 0 | Status: DISCONTINUED | OUTPATIENT
Start: 2023-06-13 | End: 2023-06-13

## 2023-06-13 RX ORDER — MAGNESIUM SULFATE 500 MG/ML
4 VIAL (ML) INJECTION ONCE
Refills: 0 | Status: COMPLETED | OUTPATIENT
Start: 2023-06-13 | End: 2023-06-13

## 2023-06-13 RX ORDER — HEPARIN SODIUM 5000 [USP'U]/ML
5000 INJECTION INTRAVENOUS; SUBCUTANEOUS EVERY 8 HOURS
Refills: 0 | Status: DISCONTINUED | OUTPATIENT
Start: 2023-06-13 | End: 2023-06-13

## 2023-06-13 RX ORDER — ALLOPURINOL 300 MG
300 TABLET ORAL DAILY
Refills: 0 | Status: DISCONTINUED | OUTPATIENT
Start: 2023-06-13 | End: 2023-06-13

## 2023-06-13 RX ORDER — OLMESARTAN MEDOXOMIL 5 MG/1
1 TABLET, FILM COATED ORAL
Refills: 0 | DISCHARGE

## 2023-06-13 RX ORDER — AMLODIPINE BESYLATE 2.5 MG/1
1 TABLET ORAL
Refills: 0 | DISCHARGE

## 2023-06-13 RX ORDER — POTASSIUM CHLORIDE 20 MEQ
40 PACKET (EA) ORAL ONCE
Refills: 0 | Status: COMPLETED | OUTPATIENT
Start: 2023-06-13 | End: 2023-06-13

## 2023-06-13 RX ORDER — POTASSIUM CHLORIDE 20 MEQ
40 PACKET (EA) ORAL
Refills: 0 | Status: DISCONTINUED | OUTPATIENT
Start: 2023-06-13 | End: 2023-06-13

## 2023-06-13 RX ORDER — FOLIC ACID 0.8 MG
1 TABLET ORAL DAILY
Refills: 0 | Status: DISCONTINUED | OUTPATIENT
Start: 2023-06-13 | End: 2023-06-13

## 2023-06-13 RX ORDER — LANOLIN ALCOHOL/MO/W.PET/CERES
3 CREAM (GRAM) TOPICAL ONCE
Refills: 0 | Status: COMPLETED | OUTPATIENT
Start: 2023-06-13 | End: 2023-06-13

## 2023-06-13 RX ORDER — ASPIRIN/CALCIUM CARB/MAGNESIUM 324 MG
1 TABLET ORAL
Qty: 30 | Refills: 0
Start: 2023-06-13 | End: 2023-07-12

## 2023-06-13 RX ORDER — CLOPIDOGREL BISULFATE 75 MG/1
75 TABLET, FILM COATED ORAL DAILY
Refills: 0 | Status: DISCONTINUED | OUTPATIENT
Start: 2023-06-13 | End: 2023-06-13

## 2023-06-13 RX ADMIN — Medication 1 MILLIGRAM(S): at 11:42

## 2023-06-13 RX ADMIN — HEPARIN SODIUM 5000 UNIT(S): 5000 INJECTION INTRAVENOUS; SUBCUTANEOUS at 05:49

## 2023-06-13 RX ADMIN — Medication 300 MILLIGRAM(S): at 11:42

## 2023-06-13 RX ADMIN — HEPARIN SODIUM 5000 UNIT(S): 5000 INJECTION INTRAVENOUS; SUBCUTANEOUS at 15:05

## 2023-06-13 RX ADMIN — Medication 40 MILLIEQUIVALENT(S): at 15:05

## 2023-06-13 RX ADMIN — Medication 81 MILLIGRAM(S): at 11:43

## 2023-06-13 RX ADMIN — Medication 40 MILLIEQUIVALENT(S): at 09:36

## 2023-06-13 RX ADMIN — Medication 100 MILLIGRAM(S): at 11:42

## 2023-06-13 RX ADMIN — Medication 25 GRAM(S): at 15:11

## 2023-06-13 RX ADMIN — Medication 1 TABLET(S): at 11:42

## 2023-06-13 NOTE — DISCHARGE NOTE PROVIDER - CARE PROVIDER_API CALL
Jesenia Harrell  Neurology  [address] [address]  Phone: [phone]  Fax: [phone]  Follow Up Time: 2 weeks

## 2023-06-13 NOTE — PROGRESS NOTE ADULT - PROBLEM SELECTOR PLAN 1
Patient with TIA v. CVA. Reports right ear fullness and blurry vision that started the morning of admission, then resolved spontaneously. Hx of stroke in 2018 with L facial droop but denies any weakness or residual symptoms. Takes Lipitor 80 daily, but does not take aspirin. Stroke code called, negative CT, CTAs and perfusion studies. On exam, patient with grossly intact neuro exam.  - start ASA 81 and Plavix 75 qd  - c/w lipitor 80 qhs  - neuro checks q4h  - permissive HTN iso possible stroke, will hold home BP meds  - ordered MRI brain  - A1c, lipid panel  - neurology following, appreciate recs

## 2023-06-13 NOTE — PATIENT PROFILE ADULT - FUNCTIONAL ASSESSMENT - DAILY ACTIVITY ASSESSMENT TYPE
----- Message from Aakash Onofre MD sent at 1/26/2023  2:27 PM EST -----  Reviewed blood work patient's A1c is elevated significant for diabetes at 6.7%.  Metformin medication prescribed to patient's pharmacy  Please have patient make follow-up schedule in about 4 weeks for diabetes    Cholesterol elevated at 121 please continue taking atorvastatin 80 mg daily   Admission

## 2023-06-13 NOTE — CONSULT NOTE ADULT - NS ATTEND AMEND GEN_ALL_CORE FT
The patient is a 43-year-old male with a history of reported stroke in 2018 with residual left-sided facial weakness (UMN predominate but upper face also involved) and mild dysarthria, HTN, alcohol use, and prior substance abuse admitted after presetning with generalized weakness, right ear fullness, and dizziness in s/o SBP in 70-80's (patient reports a several day history of increased alcohol intake w poor water/oral intake). He denies any other associated neuro symptoms. Stroke contacted yesterday for facial droop and dysarthria. CT, CTA, CTP unrevealing. Admitted to IM service.     Overall, suspicion for new stroke is low. Suspect possible worsening of baseline deficits in s/o hypotension/poor volume status. History of prior stroke is also unclear but patient should be on aspirin, statin therapy and have outpatient eval to ensure appropriate diagnosis/workup has been performed. No further workup from stroke standpoint needed.
supervision

## 2023-06-13 NOTE — DISCHARGE NOTE PROVIDER - HOSPITAL COURSE
#Discharge: do not delete    Patient is a 42yo M with PMHx of CVA in 2018 (L sided facial droop at the time, no current deficits), chronic pancreatitis, alcohol use disorder, HTN, gout who presents from [facility] for weakness, right ear "popping sensation", and blurred vision concerning for CVA v. TIA, admitted to telemetry for monitoring.     Problem/Plan - 1:  ·  Problem: TIA (transient ischemic attack).   ·  Plan: Patient with TIA v. CVA. Reports right ear fullness and blurry vision that started the morning of admission, then resolved spontaneously. Hx of stroke in 2018 with L facial droop but denies any weakness or residual symptoms. Takes Lipitor 80 daily, but does not take aspirin. Stroke code called, negative CT, CTAs and perfusion studies. On exam, patient with grossly intact neuro exam.  - start ASA 81 and Plavix 75 qd  - c/w lipitor 80 qhs  - neuro checks q4h  - permissive HTN iso possible stroke, will hold home BP meds  - ordered MRI brain  - A1c, lipid panel  - neurology following, appreciate recs.     Problem/Plan - 2:  ·  Problem: History of alcohol use disorder.   ·  Plan: Patient reports drinking up to 1 pint daily for over 20 years. Last drink 6/11 PM (approx 24 hours prior to admission). No history of alcohol withdrawal, no hx of seizures/intubations. Denies hx of cirrhosis. CIWA 2 for mild anxiety on admission, CIWA 0 this AM.  - low risk CIWA protocol  - ativan prn for CIWA > 8  - thiamine, MV, folate  - monitor LFTs.     Problem/Plan - 3:  ·  Problem: Pancreatitis.   ·  Plan: Chronic, likely 2/2 heavy alcohol use. Patient currently being evaluated by outside physician for pancreatitis. Denies abdominal pain or discomfort at this time.  - monitor symptoms for now.     Problem/Plan - 4:  ·  Problem: ALFONSO (acute kidney injury).   ·  Plan: Patient with Cr elevated to 2.32 on arrival, likely prerenal iso poor PO intake recently v. CKD. S/p 3L NS in the ED.  - obtain collateral about baseline sCr  - urine lytes  - bladder scan to r/o obstruction  - consider renal sono  - monitor I/Os  - avoid nephrotoxic medications  - monitor BMP.     Problem/Plan - 5:  ·  Problem: Hypertension.   ·  Plan: Patient with hx of HTN on home amlodipine 5mg qd and olmesartan 40mg qd.   - hold home meds now for permissive HTN iso stroke.     Problem/Plan - 6:  ·  Problem: Gout.   ·  Plan: Patient with hx of gout, no recent flare ups. Home med: allopurinol 300mg qd.  - c/w home med.    New medications: ASA 81mg qd  Labs to be followed outpatient: none  Discharge to home    Exam to be followed outpatient:   Constitutional: resting comfortably in bed; NAD  Head: NC/AT, lipoma on forehead  Eyes: PERRL, EOMI, anicteric sclera  ENT: no nasal discharge; uvula midline, no oropharyngeal erythema or exudates; MMM  Neck: supple; no JVD or thyromegaly  Respiratory: CTA B/L; no W/R/R, no retractions  Cardiac: +S1/S2; RRR; no M/R/G  Gastrointestinal: abdomen soft, NT/ND; no rebound or guarding; +BS  Back: spine midline, no bony tenderness or step-offs; no CVAT B/L  Extremities: WWP, no clubbing or cyanosis; no peripheral edema  Musculoskeletal: NROM x4; no joint swelling, tenderness or erythema  Vascular: 2+ radial, DP/PT pulses B/L  Dermatologic: skin warm, dry and intact; no rashes, wounds, or scars; +mole on back (chronic)  Neurologic: AAOx3; CNII-XII grossly intact; strength and sensation intact, +Left facial droop  Psychiatric: calm, cooperative

## 2023-06-13 NOTE — DISCHARGE NOTE NURSING/CASE MANAGEMENT/SOCIAL WORK - PATIENT PORTAL LINK FT
You can access the FollowMyHealth Patient Portal offered by [facility] by registering at the following website: [email or web address]. By joining [facility]’s FollowMyHealth portal, you will also be able to view your health information using other applications (apps) compatible with our system.

## 2023-06-13 NOTE — SBIRT NOTE ADULT - NSSBIRTBRIEFINTDET_GEN_A_CORE
Patient endorses alcohol use. Patient will have mixed vodka drinks, 1 pint on a daily basis, and drinks more frequently on the weekends. Last drink was the evening of 6/11. Patient has hx of outpatient alcohol treatment, and is currently going to AA meetings. Patient would like to engage with [facility]." SW provided support and offered additional resources, patient amenable.

## 2023-06-13 NOTE — PROGRESS NOTE ADULT - PROBLEM SELECTOR PLAN 3
Chronic, likely 2/2 heavy alcohol use. Patient currently being evaluated by outside physician for pancreatitis. Denies abdominal pain or discomfort at this time.  - monitor symptoms for now  - f/u lipase Chronic, likely 2/2 heavy alcohol use. Patient currently being evaluated by outside physician for pancreatitis. Denies abdominal pain or discomfort at this time.  - monitor symptoms for now

## 2023-06-13 NOTE — PROGRESS NOTE ADULT - PROBLEM SELECTOR PLAN 5
Patient with hx of HTN on home amlodipine 5mg qd and olmesartan 40mg qd.   - hold home meds now for permissive HTN iso stroke

## 2023-06-13 NOTE — DISCHARGE NOTE NURSING/CASE MANAGEMENT/SOCIAL WORK - NSDCPEWEB_GEN_ALL_CORE
[facility] for Tobacco Control website --- [email or web address] website --- [email or web address]

## 2023-06-13 NOTE — DISCHARGE NOTE PROVIDER - NSDCCPCAREPLAN_GEN_ALL_CORE_FT
PRINCIPAL DISCHARGE DIAGNOSIS  Diagnosis: Facial droop  Assessment and Plan of Treatment: You were admitted for symptoms of confusion and facial droop. Symptoms of stroke include trouble walking, speaking, and understanding, as well as paralysis or numbness of the face, arm, or leg. We believe there is a very low likelihood that you had a stroke. Your symptoms were likely from intoxication, low food intake, and dehydration. Your symptoms have now all resolved. Our neurology team has recommended continuing aspirin for prevention of future strokes. Please take your medications as prescribed and follow up with your Primary Care Physician within 2 weeks of discharge. Please also follow up with neurology.

## 2023-06-13 NOTE — DISCHARGE NOTE NURSING/CASE MANAGEMENT/SOCIAL WORK - NSDCFUADDAPPT_GEN_ALL_CORE_FT
Please follow up with neurology Dr. Jesenia Harrell or another provider at the same office at your earliest convenience. Call [phone] to schedule an appointment.

## 2023-06-13 NOTE — CONSULT NOTE ADULT - SUBJECTIVE AND OBJECTIVE BOX
Neurology Stroke Progress Note    42yo M with PMHx of CVA in 2018 (L sided facial droop at the time, no current deficits), chronic pancreatitis, alcohol use disorder, HTN, gout presented to [facility] on 23 for weakness, right ear "popping sensation", and blurred vision that started in the morning and resolved prior to presentation to ED. In [facility] pt was noted to have slurred speech, left side facial droop and hypotension (88/60). Stroke code called with CTH, CTA and CTP with no evidence of acute intracranial hemorrhage, infarction or occlusion. Pt transferred to [facility] for further CVA vs. TIA workup. On examination today, pt states he was drinking large amounts of alcohol this weekend and not enough food or fluids. States that his L sided facial droop and slurred speech are his baseline that he has from his prior stroke. Vascular neurology consulted for recommendation on whether we should be taking aspirin. Pt stated that he does not take aspirin, clopidogrel or Eliquis.          MEDICATIONS  (STANDING):  allopurinol 300 milliGRAM(s) Oral daily  aspirin  chewable 81 milliGRAM(s) Oral daily  atorvastatin 80 milliGRAM(s) Oral at bedtime  folic acid 1 milliGRAM(s) Oral daily  heparin   Injectable 5000 Unit(s) SubCutaneous every 8 hours  multivitamin 1 Tablet(s) Oral daily  thiamine 100 milliGRAM(s) Oral daily    MEDICATIONS  (PRN):  acetaminophen     Tablet .. 650 milliGRAM(s) Oral every 6 hours PRN Temp greater or equal to 38C (100.4F), Mild Pain (1 - 3)  LORazepam   Injectable 2 milliGRAM(s) IV Push every 2 hours PRN CIWA-Ar score 8 or greater      Allergies    No Known Allergies    Intolerances        Vital Signs Last 24 Hrs  T(C): 37.4 (2023 14:03), Max: 37.9 (2023 09:25)  T(F): 99.3 (2023 14:03), Max: 100.2 (2023 09:25)  HR: 92 (2023 16:05) (90 - 110)  BP: 132/86 (2023 16:05) (110/78 - 134/96)  BP(mean): 105 (2023 16:05) (89 - 111)  RR: 20 (2023 16:05) (16 - 22)  SpO2: 97% (2023 16:05) (96% - 98%)    Parameters below as of 2023 16:05  Patient On (Oxygen Delivery Method): room air        Physical exam:  General: No acute distress, awake and alert  Eyes: Anicteric sclerae, moist conjunctivae, see below for CNs  Neck: trachea midline, FROM, supple  Cardiovascular: Regular rate and rhythm, no murmurs, rubs, or gallops. No carotid bruits.   Pulmonary: Anterior breath sounds clear bilaterally, no crackles or wheezing. No use of accessory muscles  GI: Abdomen soft, non-distended, non-tender  Extremities: no edema    Neurologic:  -Mental status: Awake, alert, oriented to person, place, and time. Speech is fluent with intact naming, repetition, and comprehension. Mild dysarthria noted. Recent and remote memory intact. Follows simple and chronic commands. Attention/concentration intact. Fund of knowledge appropriate.  -Cranial nerves:   II: Visual fields are full to confrontation.  III, IV, VI: Extraocular movements are intact without nystagmus. Pupils equally round and reactive to light  V:  Facial sensation V1-V3 equal and intact   VII: L NLFF, possible L eyebrow flattening   VIII: Hearing is bilaterally intact to voice  XII: Tongue protrudes midline  Motor: Normal bulk and tone. No pronator drift. Strength bilateral upper extremity 5/5, bilateral lower extremities 5/5.  Sensation: Intact to light touch bilaterally.   Coordination: No dysmetria on finger-to-nose   Gait: Deferred    LABS:                        10.2   4.32  )-----------( 183      ( 2023 05:30 )             29.5     06-13    136  |  97  |  18  ----------------------------<  104<H>  3.2<L>   |  24  |  1.46<H>    Ca    8.4      2023 05:30  Phos  4.5     06-13  Mg     1.2     06-13    TPro  6.4  /  Alb  3.1<L>  /  TBili  0.4  /  DBili  x   /  AST  27  /  ALT  21  /  AlkPhos  140<H>  06-13    PT/INR - ( 2023 12:45 )   PT: 12.2 sec;   INR: 1.04          PTT - ( 2023 12:45 )  PTT:31.3 sec  Urinalysis Basic - ( 2023 12:45 )    Color: Yellow / Appearance: Clear / S.040 / pH: x  Gluc: x / Ketone: 15 mg/dL  / Bili: Negative / Urobili: 1.0 mg/dL   Blood: x / Protein: Trace mg/dL / Nitrite: Negative   Leuk Esterase: Negative / RBC: >10 /HPF / WBC 0-5 /HPF   Sq Epi: x / Non Sq Epi: x / Bacteria: Moderate /HPF        RADIOLOGY & ADDITIONAL TESTS:    CTA H&N: No large vessel occlusion or high-grade stenosis. No significant steno-occlusive disease    CTH: No acute intracranial hemorrhage, mass effect or demarcated territorial   infarction.

## 2023-06-13 NOTE — DISCHARGE NOTE PROVIDER - NSDCFUADDAPPT_GEN_ALL_CORE_FT
Please follow up with neurology Dr. Jesenia Harrell or another provider at the same office at your earliest convenience. Call [phone] to schedule an appointment.

## 2023-06-13 NOTE — CONSULT NOTE ADULT - ASSESSMENT
44yo M with PMHx of CVA in 2018 (L sided facial droop at the time, no current deficits), chronic pancreatitis, alcohol use disorder, HTN, ALFONSO, gout presented to [facility] on 6/12/23 for weakness, right ear "popping sensation", and blurred vision that started in the morning and resolved prior to presentation to ED. In [facility] pt was noted to have slurred speech, left side facial droop and hypotension (88/60). Stroke code called with CTH, CTA and CTP with no evidence of acute intracranial hemorrhage, infarction or occlusion. Pt transferred to [facility] for further CVA vs. TIA workup. On examination today, pt states he was drinking large amounts of alcohol this weekend and not enough food or fluids. States that his L sided facial droop and slurred speech are his baseline that he has from his prior stroke. Vascular neurology consulted for recommendation on whether we should be taking aspirin. Pt stated that he does not take aspirin, clopidogrel or Eliquis.    Unclear etiology of symptoms, possible recrudescence of prior stroke in the setting of hypotension     1. Secondary stroke prevention  - Start ASA 81mg PO daily   - Continue with Atorvastatin 81mg     2. Stroke risk factors  - Alcohol use disorder  - Tobacco use  - HTN    3. Further management  - Recommend further outpt neurology follow up for further investigation of prior strokes and prevention measures  - Provide stroke education  - Recommend alcohol/tobacco cession education  - Continue lovenox for DVT prophylaxis       Discussed with Neurology Attending Dr. Harrell

## 2023-06-13 NOTE — PROGRESS NOTE ADULT - SUBJECTIVE AND OBJECTIVE BOX
CC: The patient is a 43y Male admitted for     INTERVAL EVENTS: ELVIA    SUBJECTIVE / INTERVAL HPI: Patient seen and examined at bedside.     ROS: negative unless otherwise stated above.    VITAL SIGNS:  Vital Signs Last 24 Hrs  T(C): 36.9 (2023 06:29), Max: 37.6 (2023 17:02)  T(F): 98.4 (2023 06:29), Max: 99.6 (2023 17:02)  HR: 90 (2023 04:20) (90 - 116)  BP: 112/74 (2023 04:20) (88/60 - 134/96)  BP(mean): 89 (2023 04:20) (89 - 111)  RR: 19 (2023 04:20) (15 - 19)  SpO2: 97% (2023 04:20) (97% - 98%)    Parameters below as of 2023 04:20  Patient On (Oxygen Delivery Method): room air          23 @ 07:01  -  23 @ 07:00  --------------------------------------------------------  IN: 0 mL / OUT: 400 mL / NET: -400 mL        PHYSICAL EXAM:  Constitutional: resting comfortably; NAD  HEENT: NC/AT, PERRL, EOMI, anicteric sclera, MMM  Neck: supple; no JVD or thyromegaly  Respiratory: CTA B/L; no W/R/R, no retractions  Cardiac: +S1/S2; RRR; no M/R/G  Gastrointestinal: soft, NT/ND; no rebound or guarding; +BSx4  Extremities: WWP, no clubbing or cyanosis; no peripheral edema  Musculoskeletal: NROM x4; no joint swelling, tenderness or erythema  Vascular: 2+ radial, DP/PT pulses B/L  Dermatologic: skin warm, dry and intact; no rashes, wounds, or scars  Neurologic: AAOx3, no focal deficits  Psychiatric: calm, cooperative, behaviors are appropriate, denies SI/HI    MEDICATIONS:  MEDICATIONS  (STANDING):  allopurinol 300 milliGRAM(s) Oral daily  aspirin  chewable 81 milliGRAM(s) Oral daily  atorvastatin 80 milliGRAM(s) Oral at bedtime  clopidogrel Tablet 75 milliGRAM(s) Oral daily  folic acid 1 milliGRAM(s) Oral daily  heparin   Injectable 5000 Unit(s) SubCutaneous every 8 hours  multivitamin 1 Tablet(s) Oral daily  thiamine 100 milliGRAM(s) Oral daily    MEDICATIONS  (PRN):  acetaminophen     Tablet .. 650 milliGRAM(s) Oral every 6 hours PRN Temp greater or equal to 38C (100.4F), Mild Pain (1 - 3)  LORazepam   Injectable 2 milliGRAM(s) IV Push every 2 hours PRN CIWA-Ar score 8 or greater      ALLERGIES:  Allergies    No Known Allergies    Intolerances        LABS:                        10.2   4.32  )-----------( 183      ( 2023 05:30 )             29.5     06-13    136  |  97  |  18  ----------------------------<  104<H>  3.2<L>   |  24  |  1.46<H>    Ca    8.4      2023 05:30  Phos  4.5     06-13  Mg     1.2     06-13    TPro  6.4  /  Alb  3.1<L>  /  TBili  0.4  /  DBili  x   /  AST  27  /  ALT  21  /  AlkPhos  140<H>  06-13    PT/INR - ( 2023 12:45 )   PT: 12.2 sec;   INR: 1.04          PTT - ( 2023 12:45 )  PTT:31.3 sec  Urinalysis Basic - ( 2023 12:45 )    Color: Yellow / Appearance: Clear / S.040 / pH: x  Gluc: x / Ketone: 15 mg/dL  / Bili: Negative / Urobili: 1.0 mg/dL   Blood: x / Protein: Trace mg/dL / Nitrite: Negative   Leuk Esterase: Negative / RBC: >10 /HPF / WBC 0-5 /HPF   Sq Epi: x / Non Sq Epi: x / Bacteria: Moderate /HPF      POCT Blood Glucose.: 111 mg/dL (2023 12:41)      RADIOLOGY & ADDITIONAL TESTS: Reviewed.   CC: The patient is a 43y male admitted for stroke.    INTERVAL EVENTS: ELVIA    SUBJECTIVE / INTERVAL HPI: Patient seen and examined at bedside. Feeling fine this AM with no complaints.     ROS: negative unless otherwise stated above.    VITAL SIGNS:  Vital Signs Last 24 Hrs  T(C): 36.9 (2023 06:29), Max: 37.6 (2023 17:02)  T(F): 98.4 (2023 06:29), Max: 99.6 (2023 17:02)  HR: 90 (2023 04:20) (90 - 116)  BP: 112/74 (2023 04:20) (88/60 - 134/96)  BP(mean): 89 (2023 04:20) (89 - 111)  RR: 19 (2023 04:20) (15 - 19)  SpO2: 97% (2023 04:20) (97% - 98%)    Parameters below as of 2023 04:20  Patient On (Oxygen Delivery Method): room air      23 @ 07:01  -  23 @ 07:00  --------------------------------------------------------  IN: 0 mL / OUT: 400 mL / NET: -400 mL      PHYSICAL EXAM:  Constitutional: resting comfortably in bed; NAD  Head: NC/AT, lipoma on forehead  Eyes: PERRL, EOMI, anicteric sclera  ENT: no nasal discharge; uvula midline, no oropharyngeal erythema or exudates; MMM  Neck: supple; no JVD or thyromegaly  Respiratory: CTA B/L; no W/R/R, no retractions  Cardiac: +S1/S2; RRR; no M/R/G  Gastrointestinal: abdomen soft, NT/ND; no rebound or guarding; +BS  Back: spine midline, no bony tenderness or step-offs; no CVAT B/L  Extremities: WWP, no clubbing or cyanosis; no peripheral edema  Musculoskeletal: NROM x4; no joint swelling, tenderness or erythema  Vascular: 2+ radial, DP/PT pulses B/L  Dermatologic: skin warm, dry and intact; no rashes, wounds, or scars; +mole on back (chronic)  Neurologic: AAOx3; CNII-XII grossly intact; strength and sensation intact, +Left facial droop  Psychiatric: calm, cooperative      MEDICATIONS:  MEDICATIONS  (STANDING):  allopurinol 300 milliGRAM(s) Oral daily  aspirin  chewable 81 milliGRAM(s) Oral daily  atorvastatin 80 milliGRAM(s) Oral at bedtime  clopidogrel Tablet 75 milliGRAM(s) Oral daily  folic acid 1 milliGRAM(s) Oral daily  heparin   Injectable 5000 Unit(s) SubCutaneous every 8 hours  multivitamin 1 Tablet(s) Oral daily  thiamine 100 milliGRAM(s) Oral daily    MEDICATIONS  (PRN):  acetaminophen     Tablet .. 650 milliGRAM(s) Oral every 6 hours PRN Temp greater or equal to 38C (100.4F), Mild Pain (1 - 3)  LORazepam   Injectable 2 milliGRAM(s) IV Push every 2 hours PRN CIWA-Ar score 8 or greater      ALLERGIES:  Allergies    No Known Allergies    Intolerances        LABS:                        10.2   4.32  )-----------( 183      ( 2023 05:30 )             29.5     06-13    136  |  97  |  18  ----------------------------<  104<H>  3.2<L>   |  24  |  1.46<H>    Ca    8.4      2023 05:30  Phos  4.5     06-13  Mg     1.2     06-13    TPro  6.4  /  Alb  3.1<L>  /  TBili  0.4  /  DBili  x   /  AST  27  /  ALT  21  /  AlkPhos  140<H>  06-13    PT/INR - ( 2023 12:45 )   PT: 12.2 sec;   INR: 1.04          PTT - ( 2023 12:45 )  PTT:31.3 sec  Urinalysis Basic - ( 2023 12:45 )    Color: Yellow / Appearance: Clear / S.040 / pH: x  Gluc: x / Ketone: 15 mg/dL  / Bili: Negative / Urobili: 1.0 mg/dL   Blood: x / Protein: Trace mg/dL / Nitrite: Negative   Leuk Esterase: Negative / RBC: >10 /HPF / WBC 0-5 /HPF   Sq Epi: x / Non Sq Epi: x / Bacteria: Moderate /HPF      POCT Blood Glucose.: 111 mg/dL (2023 12:41)      RADIOLOGY & ADDITIONAL TESTS: Reviewed.

## 2023-06-13 NOTE — PROGRESS NOTE ADULT - PROBLEM SELECTOR PLAN 2
Patient reports drinking up to 1 pint daily for over 20 years. Last drink 6/11 PM (approx 24 hours prior to admission). No history of alcohol withdrawal, no hx of seizures/intubations. Denies hx of cirrhosis. CIWA 2 for mild anxiety on exam.   - low risk CIWA protocol  - ativan prn for CIWA > 8  - thiamine, MV, folate  - monitor LFTs Patient reports drinking up to 1 pint daily for over 20 years. Last drink 6/11 PM (approx 24 hours prior to admission). No history of alcohol withdrawal, no hx of seizures/intubations. Denies hx of cirrhosis. CIWA 2 for mild anxiety on admission, CIWA 0 this AM.  - low risk CIWA protocol  - ativan prn for CIWA > 8  - thiamine, MV, folate  - monitor LFTs

## 2023-06-13 NOTE — PATIENT PROFILE ADULT - FALL HARM RISK - HARM RISK INTERVENTIONS
MS: SR [id number]  .16/.10/.39       Communicate Risk of Fall with Harm to all staff/Reinforce activity limits and safety measures with patient and family/Tailored Fall Risk Interventions/Visual Cue: Yellow wristband and red socks/Bed in lowest position, wheels locked, appropriate side rails in place/Call bell, personal items and telephone in reach/Instruct patient to call for assistance before getting out of bed or chair/Non-slip footwear when patient is out of bed/[place] to call system/Physically safe environment - no spills, clutter or unnecessary equipment/Purposeful Proactive Rounding/Room/bathroom lighting operational, light cord in reach

## 2023-06-13 NOTE — DISCHARGE NOTE PROVIDER - NSDCMRMEDTOKEN_GEN_ALL_CORE_FT
allopurinol 300 mg oral tablet: 1 tab(s) orally once a day  aspirin 81 mg oral tablet, chewable: 1 tab(s) orally once a day  atorvastatin 80 mg oral tablet: 1 tab(s) orally once a day  olmesartan 40 mg oral tablet: 1 tab(s) orally once a day

## 2023-06-18 DIAGNOSIS — F10.929 ALCOHOL USE, UNSPECIFIED WITH INTOXICATION, UNSPECIFIED: ICD-10-CM

## 2023-06-18 DIAGNOSIS — I10 ESSENTIAL (PRIMARY) HYPERTENSION: ICD-10-CM

## 2023-06-18 DIAGNOSIS — Z87.891 PERSONAL HISTORY OF NICOTINE DEPENDENCE: ICD-10-CM

## 2023-06-18 DIAGNOSIS — M10.9 GOUT, UNSPECIFIED: ICD-10-CM
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
## 2023-06-18 DIAGNOSIS — I69.392 FACIAL WEAKNESS FOLLOWING CEREBRAL INFARCTION: ICD-10-CM

## 2023-06-18 DIAGNOSIS — F32.A DEPRESSION, UNSPECIFIED: ICD-10-CM

## 2023-06-18 DIAGNOSIS — R29.810 FACIAL WEAKNESS: ICD-10-CM

## 2023-06-18 DIAGNOSIS — E86.0 DEHYDRATION: ICD-10-CM

## 2023-06-18 DIAGNOSIS — I69.322 DYSARTHRIA FOLLOWING CEREBRAL INFARCTION: ICD-10-CM

## 2023-06-18 DIAGNOSIS — K86.0 ALCOHOL-INDUCED CHRONIC PANCREATITIS: ICD-10-CM

## 2023-06-18 LAB
CULTURE RESULTS: SIGNIFICANT CHANGE UP
CULTURE RESULTS: SIGNIFICANT CHANGE UP
SPECIMEN SOURCE: SIGNIFICANT CHANGE UP
SPECIMEN SOURCE: SIGNIFICANT CHANGE UP

## 2024-02-22 NOTE — ED ADULT NURSE NOTE - GASTROINTESTINAL WDL
Regular Diet - No restrictions Abdomen soft, nontender, nondistended, bowel sounds present in all 4 quadrants.

## 2024-07-23 NOTE — PATIENT PROFILE ADULT - WILL THE PATIENT ACCEPT THE PFIZER COVID-19 VACCINE IF ELIGIBLE AND IT IS AVAILABLE?
[FreeTextEntry2] : patient c/o increased pain in left hip.  states that he had IA CSI 11/29/23 which helped until 6 weeks ago NKI. c/o left groin pain 3-5/10 when he is walking or getting in and out of car.  has been using advil for pain.  
No